# Patient Record
Sex: MALE | URBAN - METROPOLITAN AREA
[De-identification: names, ages, dates, MRNs, and addresses within clinical notes are randomized per-mention and may not be internally consistent; named-entity substitution may affect disease eponyms.]

---

## 2024-11-27 ENCOUNTER — APPOINTMENT (OUTPATIENT)
Dept: ORTHOPEDIC SURGERY | Age: 19
End: 2024-11-27
Payer: COMMERCIAL

## 2024-11-27 VITALS — BODY MASS INDEX: 25.9 KG/M2 | HEIGHT: 72 IN | WEIGHT: 191.2 LBS

## 2024-11-27 DIAGNOSIS — M25.512 LEFT SHOULDER PAIN, UNSPECIFIED CHRONICITY: ICD-10-CM

## 2024-11-27 DIAGNOSIS — M75.22 BICEPS TENDINITIS OF LEFT SHOULDER: Primary | ICD-10-CM

## 2024-11-27 PROCEDURE — 99204 OFFICE O/P NEW MOD 45 MIN: CPT | Performed by: EMERGENCY MEDICINE

## 2024-11-27 PROCEDURE — 3008F BODY MASS INDEX DOCD: CPT | Performed by: EMERGENCY MEDICINE

## 2024-11-27 RX ORDER — MELOXICAM 15 MG/1
15 TABLET ORAL DAILY
Qty: 30 TABLET | Refills: 0 | Status: SHIPPED | OUTPATIENT
Start: 2024-11-27 | End: 2024-12-27

## 2024-11-27 NOTE — PROGRESS NOTES
Subjective    Patient ID: Aamir Hernandez is a 19 y.o. male.    Chief Complaint: Pain of the Left Shoulder (. 4 month ago he was playing baseball with his little brother, and did a one handed swing. He felt a pull in his shoulder. He now feels a sharp pain with some movements/Cupping with PT , needling, stem /Xr today)     Last Surgery: No surgery found  Last Surgery Date: No surgery found    Aamir is a very pleasant 18 yo M coming in with his dad who is helping provide additional history as Aamir is presenting with some acute versus subacute left shoulder discomfort.  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  He plays outfield and is right-hand dominant.  About 4 months ago he was swinging and swung a bat essentially just with his left hand and felt a pull in the anterior aspect of the left shoulder.  Since that time he has had discomfort mainly with swinging a bat and otherwise is relatively asymptomatic.  He has been seen by physical therapist down in [place] and has responded relatively well to cupping, stim, and needling, but is still having discomfort with swinging a bat.  He is home on [facility] break right now and came in to be evaluated.  His therapist thinks that he likely has some bicep tendinitis.  He has no symptoms while sitting here speaking with me.  He would like to avoid surgery obviously if at all possible.        Objective   Right Shoulder Exam   Right shoulder exam is normal.      Left Shoulder Exam     Tenderness   The patient is experiencing tenderness in the biceps tendon.    Range of Motion   The patient has normal left shoulder ROM.  Active abduction:  normal   Passive abduction:  normal   Extension:  normal   External rotation:  normal   Forward flexion:  normal   Internal rotation 0 degrees:  normal   Internal rotation 90 degrees:  normal     Muscle Strength   The patient has normal left shoulder strength.  Abduction:  5/5   Internal rotation: 5/5   External rotation: 5/5   Supraspinatus: 5/5   Subscapularis: 5/5   Biceps: 5/5     Tests   Haro test: negative  Cross arm: negative  Impingement: negative  Drop arm: negative    Other   Erythema: absent  Sensation: normal  Pulse: present     Comments:  Negative Dryden's.  Bicep provocative testing is very minimally positive. No evidence of shoulder instability.            Image Results:  No image results found.    X-rays of the left shoulder were obtained here today in [place] and were reviewed and interpreted by me.  Grossly normal without any evidence of acute injury or fracture.    Assessment/Plan   Encounter Diagnoses:  Biceps tendinitis of left shoulder    Left shoulder pain, unspecified chronicity    Orders Placed This Encounter    XR shoulder left 2+ views    meloxicam (Mobic) 15 mg tablet     No follow-ups on file.    We discussed his treatment options and agreed for him to continue doing physical therapy with home exercises.  He has not yet started anti-inflammatories and his symptoms are very mild so he is going to start taking meloxicam daily over the next month.  He will then follow-up with me in the office when he is home again next month for winter break and if he is still symptomatic we would likely do a left bicep tendon sheath cortisone injection under ultrasound guidance.  Eventually if he does not respond to conservative treatment measures we would likely get an MRI and have him see Dr. Penn if needed.     ** Please excuse any errors in grammar or translation related to this dictation. Voice recognition software was utilized to prepare this document. **       Migue Sanchez MD  [facility]

## 2024-12-18 ENCOUNTER — APPOINTMENT (OUTPATIENT)
Dept: ORTHOPEDIC SURGERY | Age: 19
End: 2024-12-18
Payer: COMMERCIAL

## 2024-12-23 ENCOUNTER — APPOINTMENT (OUTPATIENT)
Dept: ORTHOPEDIC SURGERY | Age: 19
End: 2024-12-23
Payer: COMMERCIAL

## 2024-12-23 ENCOUNTER — HOSPITAL ENCOUNTER (OUTPATIENT)
Dept: RADIOLOGY | Facility: EXTERNAL LOCATION | Age: 19
Discharge: HOME | End: 2024-12-23

## 2024-12-23 VITALS — HEIGHT: 72 IN | BODY MASS INDEX: 25.87 KG/M2 | WEIGHT: 191 LBS

## 2024-12-23 DIAGNOSIS — M75.22 BICEPS TENDINITIS OF LEFT SHOULDER: Primary | ICD-10-CM

## 2024-12-23 PROCEDURE — 99214 OFFICE O/P EST MOD 30 MIN: CPT | Performed by: EMERGENCY MEDICINE

## 2024-12-23 PROCEDURE — 20551 NJX 1 TENDON ORIGIN/INSJ: CPT | Performed by: EMERGENCY MEDICINE

## 2024-12-23 PROCEDURE — 3008F BODY MASS INDEX DOCD: CPT | Performed by: EMERGENCY MEDICINE

## 2024-12-23 PROCEDURE — 76942 ECHO GUIDE FOR BIOPSY: CPT | Performed by: EMERGENCY MEDICINE

## 2024-12-23 RX ORDER — TRIAMCINOLONE ACETONIDE 40 MG/ML
40 INJECTION, SUSPENSION INTRA-ARTICULAR; INTRAMUSCULAR
Status: COMPLETED | OUTPATIENT
Start: 2024-12-23 | End: 2024-12-23

## 2024-12-23 RX ORDER — LIDOCAINE HYDROCHLORIDE 10 MG/ML
3 INJECTION, SOLUTION INFILTRATION; PERINEURAL
Status: COMPLETED | OUTPATIENT
Start: 2024-12-23 | End: 2024-12-23

## 2024-12-23 ASSESSMENT — PAIN - FUNCTIONAL ASSESSMENT: PAIN_FUNCTIONAL_ASSESSMENT: NO/DENIES PAIN

## 2024-12-23 NOTE — PROGRESS NOTES
Subjective    Patient ID: Aamir Hernandez is a 19 y.o. male.    Chief Complaint: Follow-up of the Left Shoulder (Tried Meloxicam with no relief.  Still has pain when swinging a bat )     Last Surgery: No surgery found  Last Surgery Date: No surgery found    Aamir is a very pleasant 20 yo M coming in with his dad who is helping provide additional history as Aamir is presenting with some acute versus subacute left shoulder discomfort.  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  He plays outfield and is right-hand dominant.  About 4 months ago he was swinging and swung a bat essentially just with his left hand and felt a pull in the anterior aspect of the left shoulder.  Since that time he has had discomfort mainly with swinging a bat and otherwise is relatively asymptomatic.  He has been seen by physical therapist down in [place] and has responded relatively well to cupping, stim, and needling, but is still having discomfort with swinging a bat.  He is home on Thanksgiving break right now and came in to be evaluated.  His therapist thinks that he likely has some bicep tendinitis.  He has no symptoms while sitting here speaking with me.  He would like to avoid surgery obviously if at all possible. We agreed for him to continue doing physical therapy with home exercises.  He has not yet started anti-inflammatories and his symptoms are very mild so he is going to start taking meloxicam daily over the next month.  He will then follow-up with me in the office when he is home again next month for winter break and if he is still symptomatic we would likely do a left bicep tendon sheath cortisone injection under ultrasound guidance.  Eventually if he does not respond to conservative treatment measures we would likely get an MRI and have him see Dr. Penn if needed.     Update on 12/23/2024.  Aamir is coming back in, today with his mother who is helping provide additional  history, for a follow-up visit for his acute on chronic left shoulder discomfort.  He is right-hand dominant.  The meloxicam is provided no relief and he still has anterior left shoulder pain but only when swinging a baseball bat.  He has no pain at rest.  He denies any numbness or weakness.  No change in range of motion.  Overall he feels about the same as he did at his last visit.        Objective   Right Shoulder Exam   Right shoulder exam is normal.      Left Shoulder Exam     Tenderness   The patient is experiencing no tenderness.     Range of Motion   The patient has normal left shoulder ROM.  Active abduction:  normal   Passive abduction:  normal   Extension:  normal   External rotation:  normal   Forward flexion:  normal   Internal rotation 0 degrees:  normal   Internal rotation 90 degrees:  normal     Muscle Strength   The patient has normal left shoulder strength.  Abduction: 5/5   Internal rotation: 5/5   External rotation: 5/5   Supraspinatus: 5/5   Subscapularis: 5/5   Biceps: 5/5     Tests   Haro test: negative  Cross arm: negative  Impingement: negative  Drop arm: negative    Other   Erythema: absent  Sensation: normal  Pulse: present     Comments:  Negative Haines's.  Bicep provocative testing is very minimally positive.  no evidence of shoulder instability.  Exam grossly unchanged                 Image Results:  Point of Care Ultrasound  These images are not reportable by radiology and will not be interpreted   by  Radiologists.    No new imaging today    Patient ID: Aamir Hernandez is a 19 y.o. male.    Tendon Sheath Injection: left long head of biceps tendon sheath on 12/23/2024 9:36 AM  Indications: pain, diagnostic and therapeutic benefit  Details: 22 G needle, ultrasound-guided anterior approach  Medications: 40 mg triamcinolone acetonide 40 mg/mL; 3 mL lidocaine 10 mg/mL (1 %)  Outcome: tolerated well, no immediate complications  Procedure, treatment alternatives, risks and benefits  explained, specific risks discussed. Consent was given by the patient. Immediately prior to procedure a time out was called to verify the correct patient, procedure, equipment, support staff and site/side marked as required. Patient was prepped and draped in the usual sterile fashion.           Assessment/Plan   Encounter Diagnoses:  Biceps tendinitis of left shoulder    Orders Placed This Encounter    Tendon Sheath Injection    Point of Care Ultrasound     No follow-ups on file.    We discussed his treatment options and agreed to perform a left bicep tendon sheath cortisone injection under ultrasound guidance here today in the office.  The patient tolerated the procedure well without any complications and activity modifications were reviewed.  Today's injection will be hopefully both therapeutic and diagnostic.  If he does not get good pain relief in the next 2 to 3 weeks he is going to call my office and I will likely put in an update note and order a left shoulder MRA.     ** Please excuse any errors in grammar or translation related to this dictation. Voice recognition software was utilized to prepare this document. **       Migue Sanchez MD  [facility] Sports Medicine

## 2025-01-10 ENCOUNTER — APPOINTMENT (OUTPATIENT)
Dept: ORTHOPEDIC SURGERY | Facility: CLINIC | Age: 20
End: 2025-01-10
Payer: COMMERCIAL

## 2025-01-15 DIAGNOSIS — M75.22 BICEPS TENDINITIS OF LEFT SHOULDER: Primary | ICD-10-CM

## 2025-01-15 NOTE — PROGRESS NOTES
Patient is still having persistent pain after the injections.  He is at school in [place].  MRI ordered.

## 2025-05-26 ENCOUNTER — OFFICE VISIT (OUTPATIENT)
Dept: URGENT CARE | Age: 20
End: 2025-05-26
Payer: COMMERCIAL

## 2025-05-26 VITALS
RESPIRATION RATE: 18 BRPM | OXYGEN SATURATION: 96 % | SYSTOLIC BLOOD PRESSURE: 131 MMHG | HEART RATE: 60 BPM | TEMPERATURE: 97.7 F | DIASTOLIC BLOOD PRESSURE: 78 MMHG | BODY MASS INDEX: 24.55 KG/M2 | WEIGHT: 181 LBS

## 2025-05-26 DIAGNOSIS — R05.1 ACUTE COUGH: Primary | ICD-10-CM

## 2025-05-26 PROCEDURE — 1036F TOBACCO NON-USER: CPT

## 2025-05-26 PROCEDURE — 99203 OFFICE O/P NEW LOW 30 MIN: CPT

## 2025-05-26 RX ORDER — BROMPHENIRAMINE MALEATE, PSEUDOEPHEDRINE HYDROCHLORIDE, AND DEXTROMETHORPHAN HYDROBROMIDE 2; 30; 10 MG/5ML; MG/5ML; MG/5ML
5 SYRUP ORAL 4 TIMES DAILY PRN
Qty: 120 ML | Refills: 0 | Status: SHIPPED | OUTPATIENT
Start: 2025-05-26 | End: 2025-06-05

## 2025-05-26 ASSESSMENT — PATIENT HEALTH QUESTIONNAIRE - PHQ9
1. LITTLE INTEREST OR PLEASURE IN DOING THINGS: NOT AT ALL
2. FEELING DOWN, DEPRESSED OR HOPELESS: NOT AT ALL
SUM OF ALL RESPONSES TO PHQ9 QUESTIONS 1 AND 2: 0

## 2025-05-26 ASSESSMENT — PAIN SCALES - GENERAL: PAINLEVEL_OUTOF10: 0-NO PAIN

## 2025-05-26 ASSESSMENT — ENCOUNTER SYMPTOMS: COUGH: 1

## 2025-05-26 NOTE — PROGRESS NOTES
Subjective   Patient ID: Aamir Hernandez is a 19 y.o. male. They present today with a chief complaint of Cough (X 1 week).    History of Present Illness  Patient is a 19-year-old male with history of environmental allergies who presents urgent care today with a complaint of ongoing cough.  He states his cough started roughly a week ago.  He states sometimes it is wet and sometimes that is dry.  He has been using over-the-counter allergy medication without significant relief.  He denies rhinitis or congestion, fevers, sinus pain/pressure, chest pain or shortness of breath.  No other complaints or concerns mention at this time.      History provided by:  Patient  Cough        Past Medical History  Allergies as of 05/26/2025    (No Known Allergies)       Prescriptions Prior to Admission[1]       Medical History[2]    Surgical History[3]     reports that he has never smoked. He has never used smokeless tobacco. He reports that he does not drink alcohol and does not use drugs.    Review of Systems  Review of Systems   Respiratory:  Positive for cough.                                   Objective    Vitals:    05/26/25 1037   BP: 131/78   Pulse: 60   Resp: 18   Temp: 36.5 °C (97.7 °F)   SpO2: 96%   Weight: 82.1 kg (181 lb)     No LMP for male patient.    Physical Exam  Vitals and nursing note reviewed.   Constitutional:       General: He is not in acute distress.     Appearance: Normal appearance. He is not ill-appearing, toxic-appearing or diaphoretic.   HENT:      Head: Normocephalic and atraumatic.      Nose: Nose normal.      Mouth/Throat:      Mouth: Mucous membranes are moist.   Eyes:      Extraocular Movements: Extraocular movements intact.      Conjunctiva/sclera: Conjunctivae normal.      Pupils: Pupils are equal, round, and reactive to light.   Cardiovascular:      Rate and Rhythm: Normal rate and regular rhythm.      Pulses: Normal pulses.      Heart sounds: Normal heart sounds.   Pulmonary:      Effort: Pulmonary  effort is normal. No respiratory distress.      Breath sounds: Normal breath sounds. No stridor. No wheezing, rhonchi or rales.   Chest:      Chest wall: No tenderness.   Musculoskeletal:         General: Normal range of motion.      Cervical back: Normal range of motion and neck supple.   Skin:     General: Skin is warm and dry.      Capillary Refill: Capillary refill takes less than 2 seconds.   Neurological:      General: No focal deficit present.      Mental Status: He is alert and oriented to person, place, and time.   Psychiatric:         Mood and Affect: Mood normal.         Behavior: Behavior normal.         Procedures      Assessment/Plan   Allergies, medications, history, and pertinent labs/EKGs/Imaging reviewed by MARGARITO Lofton.     Medical Decision Making    Patient is well appearing, afebrile, non toxic, not hypoxic, and appropriate for outpatient treatment and management at time of evaluation. Patient presents with ongoing cough.     Differential includes but not limited to: Bronchitis, seasonal allergies, pneumonia, sinus infection, other    On exam, patient is afebrile appears well-hydrated.  Lung sounds are clear in all fields bilaterally.  No wheeze or rhonchi appreciated.  Nose without congestion or rhinitis.  No sinus pain/pressure with palpation.  Vitals within normal limits.  Oxygen saturation room air is 96%.    Consider viral testing but given the fact symptoms started over a week ago, doubt this testing would be beneficial or alter course of action.  Low suspicion for pneumonia or bronchitis based on history and exam.  Suspect patient's symptoms are secondary to postnasal drip/allergic rhinitis.  Recommended continued use of over-the-counter antihistamines as well as Flonase.  A prescription for Bromfed called into patient's pharmacy use as directed.   Patient was given the opportunity to ask questions.     Discussed return precautions and importance of follow-up. Advised to  follow-up with PCP.  We spoke at length regarding the red flags he/she should be aware of and monitor for.  I specifically advised to go to the ED for changing or worsening symptoms, new symptoms, complaint specific precautions, and precautions listed on the discharge paperwork.    The plan of care was mutually agreed upon with the patient. All questions and concerns were answered to the best of my ability with today's information.  Patient was discharged in stable condition.    Dictation software was used in the creation of this note which does not evaluate or correct for typographical, spelling, syntax or grammatical errors.    Orders and Diagnoses  Diagnoses and all orders for this visit:  Acute cough  -     brompheniramine-pseudoeph-DM 2-30-10 mg/5 mL syrup; Take 5 mL by mouth 4 times a day as needed for allergies for up to 10 days.      Medical Admin Record      Follow Up Instructions  No follow-ups on file.    Patient disposition: Home    Electronically signed by MARGARITO Lofton  11:06 AM       [1] (Not in a hospital admission)  [2] History reviewed. No pertinent past medical history.  [3] History reviewed. No pertinent surgical history.

## 2025-05-26 NOTE — PATIENT INSTRUCTIONS
You were seen at Urgent Care today for ongoing cough. Please treat as discussed. Please take medications as prescribed. Monitor for red flags which we spoke about, If your symptoms change, worsen or become concerning in any way, please go to the emergency room immediately, otherwise you can followup with your PCP in 2-3 days as needed